# Patient Record
Sex: FEMALE | Employment: STUDENT | URBAN - METROPOLITAN AREA
[De-identification: names, ages, dates, MRNs, and addresses within clinical notes are randomized per-mention and may not be internally consistent; named-entity substitution may affect disease eponyms.]

---
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
## 2022-02-15 ENCOUNTER — HOSPITAL ENCOUNTER (EMERGENCY)
Facility: HOSPITAL | Age: 22
Discharge: HOME | End: 2022-02-15
Attending: EMERGENCY MEDICINE
Payer: MEDICAID

## 2022-02-15 ENCOUNTER — APPOINTMENT (OUTPATIENT)
Dept: ULTRASOUND IMAGING | Facility: HOSPITAL | Age: 22
End: 2022-02-15
Attending: EMERGENCY MEDICINE
Payer: MEDICAID

## 2022-02-15 VITALS
OXYGEN SATURATION: 98 % | HEIGHT: 64 IN | RESPIRATION RATE: 18 BRPM | DIASTOLIC BLOOD PRESSURE: 76 MMHG | WEIGHT: 170 LBS | BODY MASS INDEX: 29.02 KG/M2 | TEMPERATURE: 99 F | SYSTOLIC BLOOD PRESSURE: 109 MMHG | HEART RATE: 63 BPM

## 2022-02-15 DIAGNOSIS — T14.8XXA MUSCLE STRAIN: Primary | ICD-10-CM

## 2022-02-15 PROCEDURE — 93971 EXTREMITY STUDY: CPT | Performed by: EMERGENCY MEDICINE

## 2022-02-15 PROCEDURE — 99284 EMERGENCY DEPT VISIT MOD MDM: CPT

## 2022-02-15 RX ORDER — IBUPROFEN 600 MG/1
600 TABLET ORAL EVERY 8 HOURS PRN
Qty: 30 TABLET | Refills: 0 | Status: SHIPPED | OUTPATIENT
Start: 2022-02-15 | End: 2022-02-25

## 2022-02-15 RX ORDER — IBUPROFEN 600 MG/1
600 TABLET ORAL ONCE
Status: COMPLETED | OUTPATIENT
Start: 2022-02-15 | End: 2022-02-15

## 2022-02-15 NOTE — ED INITIAL ASSESSMENT (HPI)
Patient to ED c/o right arm pain and swelling since Thursday.  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

## (undated) NOTE — ED AVS SNAPSHOT
THE [facility] in [place] [address] [id number]    Phone:  [phone]    Fax:  [phone]           Shell Spann   MRN: [id number]    Department:  THE [facility] in [place]   Date of Visit:  4/20/2017           [address], [address] [facility] [address]   [phone]       To Check ER Wait Times:  TEXT 'ERwait' to 74423      Click [email or web address]      Or call [phone]    If you have any problems with your follow-up, please call our  at [phone] I have read and understand the instructions given to me by my caregivers. 24[facility]Hour Pharmacies        Pharmacy Address Phone Number   [phone] ([address]) [place]

## (undated) NOTE — LETTER
February 15, 2022    Patient: Liliya Barber   Date of Visit: 2/15/2022       To Whom It May Concern:    Liliya Barber was seen and treated in our emergency department on 2/15/2022. She should not return to work until [address] [employer]. If you have any questions or concerns, please don't hesitate to call.        Encounter Provider(s):    Nohemy Dixon MD